# Patient Record
Sex: MALE | Race: WHITE
[De-identification: names, ages, dates, MRNs, and addresses within clinical notes are randomized per-mention and may not be internally consistent; named-entity substitution may affect disease eponyms.]

---

## 2018-01-17 ENCOUNTER — HOSPITAL ENCOUNTER (EMERGENCY)
Dept: HOSPITAL 31 - C.ER | Age: 28
Discharge: HOME | End: 2018-01-17
Payer: COMMERCIAL

## 2018-01-17 VITALS
DIASTOLIC BLOOD PRESSURE: 84 MMHG | RESPIRATION RATE: 20 BRPM | SYSTOLIC BLOOD PRESSURE: 140 MMHG | OXYGEN SATURATION: 98 % | TEMPERATURE: 97.9 F | HEART RATE: 70 BPM

## 2018-01-17 DIAGNOSIS — F41.9: Primary | ICD-10-CM

## 2018-01-17 NOTE — C.PDOC
History Of Present Illness


The patient presents to the ED via ambulance for evaluation after he began 

feeling nervous prior to arrival. Patient states his symptoms have improved 

upon ED arrival. He denies suicidal/homicidal ideation and has no complaints at 

this time. 


Time Seen by Provider: 01/17/18 05:36


Chief Complaint (Nursing): Anxiety


History Per: Patient


History/Exam Limitations: no limitations


Onset/Duration Of Symptoms: Hrs


Current Symptoms Are (Timing): Better


Suicide/Self Injury Attempted (Context): None


Modifying Factor(s): None


Severity: None


Pain Scale Rating Of: 0


Associated Symptoms: denies: Suicidal Thoughts, Suicidal Plan


Involuntary Hold By: None


Recent travel outside of the [place]: No


Additional History Per: Patient





Past Medical History


Reviewed: Historical Data, Nursing Documentation, Vital Signs


Vital Signs: 


 Last Vital Signs











Temp  97.9 F   01/17/18 04:59


 


Pulse  70   01/17/18 04:59


 


Resp  20   01/17/18 04:59


 


BP  140/84   01/17/18 04:59


 


Pulse Ox  98   01/17/18 05:48














- Medical History


PMH: Asthma, Bipolar Disorder, Schizophrenia


   Denies: Diabetes, Hepatitis, HIV, HTN, Seizures, Sexually Transmitted Disease


Surgical History: No Surg Hx


Family History: States: Unknown Family Hx





- Social History


Hx Tobacco Use: Yes


Hx Alcohol Use: No


Hx Substance Use: No





- Immunization History


Hx Tetanus Toxoid Vaccination: No


Hx Influenza Vaccination: No


Hx Pneumococcal Vaccination: No





Review Of Systems


Cardiovascular: Negative for: Chest Pain


Respiratory: Negative for: Shortness of Breath


Psych: Positive for: Anxiety.  Negative for: Depression, Psychosis, Suicidal 

ideation





Physical Exam





- Physical Exam


Appears: Non-toxic, No Acute Distress


Skin: Warm, Dry


Chest: Symmetrical, No Deformity, No Tenderness


Cardiovascular: Rhythm Regular, No Murmur


Respiratory: No Rales, No Rhonchi, No Wheezing


Extremity: Normal ROM, Capillary Refill (less than 2 seconds )


Neurological/Psych: Oriented x3





ED Course And Treatment


O2 Sat by Pulse Oximetry: 98 (on RA)


Pulse Ox Interpretation: Normal


Progress Note: UA ordered and reviewed.  Patient reports his symptoms have 

improved and requests discharge.





Disposition


Counseled Patient/Family Regarding: Studies Performed, Diagnosis, Need For 

Followup





- Disposition


Referrals: 


[facility] [Outside]


Disposition: HOME/ ROUTINE


Disposition Time: 05:36


Condition: FAIR


Additional Instructions: 


Please return if symptoms recur


Instructions:  Anxiety (ED)


Forms:  [facility] (English)





- Clinical Impression


Clinical Impression: 


 Anxiety








- Scribe Statement


The provider has reviewed the documentation as recorded by the Scribe (Briana Woods)


Provider Attestation: 








All medical record entries made by the Scribe were at my direction and 

personally dictated by me. I have reviewed the chart and agree that the record 

accurately reflects my personal performance of the history, physical exam, 

medical decision making, and the department course for this patient. I have 

also personally directed, reviewed, and agree with the discharge instructions 

and disposition.